# Patient Record
Sex: FEMALE | Race: WHITE | NOT HISPANIC OR LATINO | Employment: UNEMPLOYED | URBAN - METROPOLITAN AREA
[De-identification: names, ages, dates, MRNs, and addresses within clinical notes are randomized per-mention and may not be internally consistent; named-entity substitution may affect disease eponyms.]

---
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
## 2023-01-10 ENCOUNTER — HOSPITAL ENCOUNTER (OUTPATIENT)
Dept: RADIOLOGY | Facility: HOSPITAL | Age: 18
Discharge: HOME | End: 2023-01-10
Payer: COMMERCIAL

## 2023-01-10 ENCOUNTER — OFFICE VISIT (OUTPATIENT)
Dept: UROLOGY | Facility: CLINIC | Age: 18
End: 2023-01-10
Payer: COMMERCIAL

## 2023-01-10 VITALS — HEIGHT: 65 IN | WEIGHT: 116.19 LBS | BODY MASS INDEX: 19.36 KG/M2

## 2023-01-10 DIAGNOSIS — N20.0 KIDNEY STONE: Primary | ICD-10-CM

## 2023-01-10 DIAGNOSIS — R31.0 GROSS HEMATURIA: ICD-10-CM

## 2023-01-10 DIAGNOSIS — Z87.442 HISTORY OF KIDNEY STONES: ICD-10-CM

## 2023-01-10 DIAGNOSIS — N22 CALCULUS OF URINARY TRACT IN DISEASES CLASSIFIED ELSEWHERE: ICD-10-CM

## 2023-01-10 DIAGNOSIS — N20.1 URETERAL STONE: Primary | ICD-10-CM

## 2023-01-10 PROCEDURE — 1159F MED LIST DOCD IN RCRD: CPT | Mod: CPTII,S$GLB,,

## 2023-01-10 PROCEDURE — 87086 URINE CULTURE/COLONY COUNT: CPT

## 2023-01-10 PROCEDURE — 1160F RVW MEDS BY RX/DR IN RCRD: CPT | Mod: CPTII,S$GLB,,

## 2023-01-10 PROCEDURE — 1160F PR REVIEW ALL MEDS BY PRESCRIBER/CLIN PHARMACIST DOCUMENTED: ICD-10-PCS | Mod: CPTII,S$GLB,,

## 2023-01-10 PROCEDURE — 99214 OFFICE O/P EST MOD 30 MIN: CPT | Mod: S$GLB,,,

## 2023-01-10 PROCEDURE — 99999 PR PBB SHADOW E&M-EST. PATIENT-LVL II: CPT | Mod: PBBFAC,,,

## 2023-01-10 PROCEDURE — 3008F PR BODY MASS INDEX (BMI) DOCUMENTED: ICD-10-PCS | Mod: CPTII,S$GLB,,

## 2023-01-10 PROCEDURE — 74176 CT RENAL STONE STUDY ABD PELVIS WO: ICD-10-PCS | Mod: 26,,, | Performed by: RADIOLOGY

## 2023-01-10 PROCEDURE — 3008F BODY MASS INDEX DOCD: CPT | Mod: CPTII,S$GLB,,

## 2023-01-10 PROCEDURE — 1159F PR MEDICATION LIST DOCUMENTED IN MEDICAL RECORD: ICD-10-PCS | Mod: CPTII,S$GLB,,

## 2023-01-10 PROCEDURE — 74176 CT ABD & PELVIS W/O CONTRAST: CPT | Mod: 26,,, | Performed by: RADIOLOGY

## 2023-01-10 PROCEDURE — 99999 PR PBB SHADOW E&M-EST. PATIENT-LVL II: ICD-10-PCS | Mod: PBBFAC,,,

## 2023-01-10 PROCEDURE — 74176 CT ABD & PELVIS W/O CONTRAST: CPT | Mod: TC,PO

## 2023-01-10 PROCEDURE — 99214 PR OFFICE/OUTPT VISIT, EST, LEVL IV, 30-39 MIN: ICD-10-PCS | Mod: S$GLB,,,

## 2023-01-10 RX ORDER — TAMSULOSIN HYDROCHLORIDE 0.4 MG/1
0.4 CAPSULE ORAL DAILY
Qty: 10 CAPSULE | Refills: 1 | Status: SHIPPED | OUTPATIENT
Start: 2023-01-10 | End: 2023-09-20

## 2023-01-10 NOTE — PROGRESS NOTES
[facility] Note  Staff: Matilde Guevara FNP-C    PCP: MD Natividad    Chief Complaint: Gross Hematuria    Subjective:        HPI: Nadya Morris is a 17 y.o. female NEW PATIENT presents today for evaluation of gross hematuria.  She states she 1st noticed blood in her urine yesterday.  She states a few weeks ago she was experiencing urinary urgency, frequency, and lower abdominal pain.  She states she was treated for a UTI.  She does have a history of kidney stones.  Her most recent imaging is from 05/04/2022 which showed a 3 mm proximal left ureteral stone with mild left hydroureteronephrosis.  There were also punctate nonobstructing bilateral mid pole stones.  She states she is unsure if she passed this stone.  She denies dysuria, urgency, frequency, fever, flank pain, abdominal pain, back pain, nausea, vomiting, incontinence, and difficulty urinating.  She is currently not taking any bladder medications.  We discussed ED precautions.  She does have a family history of kidney stones.    Questions asked pt during office visit today:  Urgency:No, incontinence with urgency? No;   DysuriaNo  Gross HematuriaYes  History of UTI: No    History of Kidney Stones?:  Yes    Constipation issues?:  No    REVIEW OF SYSTEMS:  Review of Systems   Constitutional: Negative.  Negative for chills and fever.   HENT: Negative.     Eyes: Negative.    Respiratory: Negative.     Cardiovascular: Negative.    Gastrointestinal: Negative.  Negative for abdominal pain, nausea and vomiting.   Genitourinary:  Positive for hematuria. Negative for dysuria, flank pain, frequency and urgency.   Musculoskeletal: Negative.  Negative for back pain.   Skin: Negative.    Neurological: Negative.    Endo/Heme/Allergies: Negative.    Psychiatric/Behavioral: Negative.       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    Medications: reviewed     Objective:   There were no vitals filed for this visit.    Physical Exam  Constitutional:       Appearance: Normal appearance.   HENT:      Head: Normocephalic.      Mouth/Throat:      Mouth: Mucous membranes are moist.   Eyes:      Conjunctiva/sclera: Conjunctivae normal.   Pulmonary:      Effort: Pulmonary effort is normal.   Abdominal:      General: There is no distension.      Palpations: Abdomen is soft.      Tenderness: There is no abdominal tenderness. There is no right CVA tenderness or left CVA tenderness.   Musculoskeletal:         General: Normal range of motion.      Cervical back: Normal range of motion.   Skin:     General: Skin is warm.   Neurological:      General: No focal deficit present.      Mental Status: She is alert and oriented to person, place, and time.   Psychiatric:         Mood and Affect: Mood normal.         Behavior: Behavior normal.       LABS REVIEW:  UA today:  Could not be read on machine    Assessment:       1. Kidney stone    2. Gross hematuria    3. Calculus of urinary tract in diseases classified elsewhere    4. History of kidney stones          Plan:      CT RSS ordered stat  Recommendations:   Increase hydration 2 liters fluid per day.      Strain Urine     Take pain medications as needed     Call office for severe pain or fever >101    CT RSS shows Kidneys/ Ureters: 5 x 3 mm calculus at the left ureteropelvic junction.  Mild left hydronephrosis.No right renal or right ureteral calculi.   Bladder: No wall thickening.  2 mm calculus along the anterior bladder wall, unchanged.  No dependently layering calculi.    Tamsulosin 0.4mg prescribed to pt and pt given 7 day trial of passage.     F/u As Needed    [facility]:  Active    ROSAS Garcia

## 2023-01-12 LAB — BACTERIA UR CULT: NORMAL

## 2023-01-18 ENCOUNTER — PATIENT MESSAGE (OUTPATIENT)
Dept: UROLOGY | Facility: CLINIC | Age: 18
End: 2023-01-18
Payer: COMMERCIAL

## 2023-01-18 ENCOUNTER — NURSE TRIAGE (OUTPATIENT)
Dept: ADMINISTRATIVE | Facility: CLINIC | Age: 18
End: 2023-01-18
Payer: COMMERCIAL

## 2023-01-18 NOTE — TELEPHONE ENCOUNTER
Patient states she was seen and treated for kidney stones. Patient reports she was given a medication, Floxmax, to take and reports dizzy, nauseated, HA and foggy the past couple of days. Patient wondering if the medication should make her feel that way.Patient reports blurry vision comes and goes but YAP is constant. Patient reports mild weakness and moderate dizziness. Patient also reports she was told to take for 7 days but the RX said for 10 so she is unsure how long to take. Advised patient of dispo to see HCP within 24 hours. Verbalized understanding. Advised to call back if symptoms become worse or with further questions.      Reason for Disposition   [1] MODERATE dizziness (e.g., interferes with normal activities) AND [2] has NOT been evaluated by physician for this  (Exception: dizziness caused by heat exposure, sudden standing, or poor fluid intake)    Additional Information   Negative: SEVERE difficulty breathing (e.g., struggling for each breath, speaks in single words)   Negative: [1] Difficulty breathing or swallowing AND [2] started suddenly after medicine, an allergic food or bee sting   Negative: Shock suspected (e.g., cold/pale/clammy skin, too weak to stand, low BP, rapid pulse)   Negative: Difficult to awaken or acting confused (e.g., disoriented, slurred speech)   Negative: [1] Weakness (i.e., paralysis, loss of muscle strength) of the face, arm or leg on one side of the body AND [2] sudden onset AND [3] present now   Negative: [1] Numbness (i.e., loss of sensation) of the face, arm or leg on one side of the body AND [2] sudden onset AND [3] present now   Negative: [1] Loss of speech or garbled speech AND [2] sudden onset AND [3] present now   Negative: Overdose (accidental or intentional) of medications   Negative: [1] Fainted > 15 minutes ago AND [2] still feels too weak or dizzy to stand   Negative: Heart beating < 50 beats per minute OR > 140 beats per minute   Negative: Sounds like a  "life-threatening emergency to the triager   Negative: Difficulty breathing   Negative: SEVERE dizziness (e.g., unable to stand, requires support to walk, feels like passing out now)   Negative: Extra heart beats OR irregular heart beating (i.e., "palpitations")   Negative: [1] Drinking very little AND [2] dehydration suspected (e.g., no urine > 12 hours, very dry mouth, very lightheaded)   Negative: [1] Weakness (i.e., paralysis, loss of muscle strength) of the face, arm / hand, or leg / foot on one side of the body AND [2] sudden onset AND [3] brief (now gone)   Negative: [1] Numbness (i.e., loss of sensation) of the face, arm / hand, or leg / foot on one side of the body AND [2] sudden onset AND [3] brief (now gone)   Negative: [1] Loss of speech or garbled speech AND [2] sudden onset AND [3] brief (now gone)   Negative: Loss of vision or double vision (Exception: similar to previous migraines)   Negative: Patient sounds very sick or weak to the triager   Negative: [1] Dizziness caused by heat exposure, sudden standing, or poor fluid intake AND [2] no improvement after 2 hours of rest and fluids   Negative: [1] Fever > 103 F (39.4 C) AND [2] not able to get the fever down using Fever Care Advice   Negative: [1] Fever > 101 F (38.3 C) AND [2] age > 60 years   Negative: [1] Fever > 100.0 F (37.8 C) AND [2] bedridden (e.g., nursing home patient, CVA, chronic illness, recovering from surgery)   Negative: [1] Fever > 100.0 F (37.8 C) AND [2] diabetes mellitus or weak immune system (e.g., HIV positive, cancer chemo, splenectomy, organ transplant, chronic steroids)    Protocols used: Dizziness - [id number]-HC    "

## 2023-01-19 ENCOUNTER — PATIENT MESSAGE (OUTPATIENT)
Dept: UROLOGY | Facility: CLINIC | Age: 18
End: 2023-01-19
Payer: COMMERCIAL

## 2023-01-19 DIAGNOSIS — N20.1 URETERAL STONE: Primary | ICD-10-CM

## 2023-01-20 ENCOUNTER — HOSPITAL ENCOUNTER (OUTPATIENT)
Dept: RADIOLOGY | Facility: HOSPITAL | Age: 18
Discharge: HOME | End: 2023-01-20
Payer: COMMERCIAL

## 2023-01-20 ENCOUNTER — TELEPHONE (OUTPATIENT)
Dept: UROLOGY | Facility: CLINIC | Age: 18
End: 2023-01-20
Payer: COMMERCIAL

## 2023-01-20 DIAGNOSIS — N20.1 URETERAL STONE: ICD-10-CM

## 2023-01-20 PROCEDURE — 74018 XR ABDOMEN AP 1 VIEW: ICD-10-PCS | Mod: 26,,, | Performed by: STUDENT IN AN ORGANIZED HEALTH CARE EDUCATION/TRAINING PROGRAM

## 2023-01-20 PROCEDURE — 74018 RADEX ABDOMEN 1 VIEW: CPT | Mod: 26,,, | Performed by: STUDENT IN AN ORGANIZED HEALTH CARE EDUCATION/TRAINING PROGRAM

## 2023-01-20 PROCEDURE — 74018 RADEX ABDOMEN 1 VIEW: CPT | Mod: TC

## 2023-01-20 NOTE — TELEPHONE ENCOUNTER
----- Message from Keren Strong sent at 1/20/2023  4:47 PM CST -----  Contact: Nadya  .Type:  Test Results    Who Called: Nadya  Name of Test (Lab/Mammo/Etc): Lab  Date of Test: 01/20/2023  Ordering Provider: MISHA BARORS   Where the test was performed: [facility]  Would the patient rather a call back or a response via [facility]? Call back  Best Call Back Number: [phone]  Additional Information:  na        Thanks  GINGER

## 2023-01-23 ENCOUNTER — PATIENT MESSAGE (OUTPATIENT)
Dept: UROLOGY | Facility: CLINIC | Age: 18
End: 2023-01-23
Payer: COMMERCIAL

## 2023-09-20 ENCOUNTER — OFFICE VISIT (OUTPATIENT)
Dept: INTERNAL MEDICINE | Facility: CLINIC | Age: 18
End: 2023-09-20
Payer: COMMERCIAL

## 2023-09-20 VITALS
TEMPERATURE: 99 F | DIASTOLIC BLOOD PRESSURE: 78 MMHG | WEIGHT: 112.19 LBS | RESPIRATION RATE: 18 BRPM | BODY MASS INDEX: 18.96 KG/M2 | HEART RATE: 78 BPM | OXYGEN SATURATION: 100 % | SYSTOLIC BLOOD PRESSURE: 108 MMHG

## 2023-09-20 DIAGNOSIS — J02.0 STREP THROAT: ICD-10-CM

## 2023-09-20 DIAGNOSIS — J02.9 SORE THROAT: Primary | ICD-10-CM

## 2023-09-20 LAB
CTP QC/QA: YES
MOLECULAR STREP A: POSITIVE

## 2023-09-20 PROCEDURE — 87651 POCT STREP A MOLECULAR: ICD-10-PCS | Mod: QW,S$GLB,, | Performed by: FAMILY MEDICINE

## 2023-09-20 PROCEDURE — 3078F DIAST BP <80 MM HG: CPT | Mod: CPTII,S$GLB,, | Performed by: FAMILY MEDICINE

## 2023-09-20 PROCEDURE — 99203 OFFICE O/P NEW LOW 30 MIN: CPT | Mod: S$GLB,,, | Performed by: FAMILY MEDICINE

## 2023-09-20 PROCEDURE — 1159F MED LIST DOCD IN RCRD: CPT | Mod: CPTII,S$GLB,, | Performed by: FAMILY MEDICINE

## 2023-09-20 PROCEDURE — 99999 PR PBB SHADOW E&M-EST. PATIENT-LVL III: CPT | Mod: PBBFAC,,, | Performed by: FAMILY MEDICINE

## 2023-09-20 PROCEDURE — 3074F SYST BP LT 130 MM HG: CPT | Mod: CPTII,S$GLB,, | Performed by: FAMILY MEDICINE

## 2023-09-20 PROCEDURE — 1159F PR MEDICATION LIST DOCUMENTED IN MEDICAL RECORD: ICD-10-PCS | Mod: CPTII,S$GLB,, | Performed by: FAMILY MEDICINE

## 2023-09-20 PROCEDURE — 3078F PR MOST RECENT DIASTOLIC BLOOD PRESSURE < 80 MM HG: ICD-10-PCS | Mod: CPTII,S$GLB,, | Performed by: FAMILY MEDICINE

## 2023-09-20 PROCEDURE — 87651 STREP A DNA AMP PROBE: CPT | Mod: QW,S$GLB,, | Performed by: FAMILY MEDICINE

## 2023-09-20 PROCEDURE — 99999 PR PBB SHADOW E&M-EST. PATIENT-LVL III: ICD-10-PCS | Mod: PBBFAC,,, | Performed by: FAMILY MEDICINE

## 2023-09-20 PROCEDURE — 3074F PR MOST RECENT SYSTOLIC BLOOD PRESSURE < 130 MM HG: ICD-10-PCS | Mod: CPTII,S$GLB,, | Performed by: FAMILY MEDICINE

## 2023-09-20 PROCEDURE — 3008F BODY MASS INDEX DOCD: CPT | Mod: CPTII,S$GLB,, | Performed by: FAMILY MEDICINE

## 2023-09-20 PROCEDURE — 99203 PR OFFICE/OUTPT VISIT, NEW, LEVL III, 30-44 MIN: ICD-10-PCS | Mod: S$GLB,,, | Performed by: FAMILY MEDICINE

## 2023-09-20 PROCEDURE — 3008F PR BODY MASS INDEX (BMI) DOCUMENTED: ICD-10-PCS | Mod: CPTII,S$GLB,, | Performed by: FAMILY MEDICINE

## 2023-09-20 RX ORDER — AMOXICILLIN 875 MG/1
875 TABLET, FILM COATED ORAL EVERY 12 HOURS
Qty: 14 TABLET | Refills: 0 | Status: SHIPPED | OUTPATIENT
Start: 2023-09-20 | End: 2023-09-27

## 2023-09-20 NOTE — PATIENT INSTRUCTIONS
Take antibiotic as prescribed  Tylenol as needed for fever  Rest and Increase Fluid Intake  Start taking probiotic like culturelle or align to help limit GI upset from antibiotics  Should improve in 48-72 hours  RTC if any worsening despite above medicines or as needed

## 2023-09-20 NOTE — PROGRESS NOTES
Subjective:      Patient ID: Nadya Morris is a 18 y.o. female.    Chief Complaint: Sore Throat    Sore throat worsening for the past three days.   No fever.   Does sometimes have a rattling cough.   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)     Strep sore throat      Past Surgical History:   Procedure Laterality Date    TONSILLECTOMY       Review of patient's allergies indicates:  No Known Allergies  Social History     Socioeconomic History    Marital status: Single   Tobacco Use    Smoking status: Never    Smokeless tobacco: Never   Substance and Sexual Activity    Alcohol use: Never    Drug use: Never    Sexual activity: Never   Social History Narrative    11 th grade    Excellent grades    [employer] country     Family History   Problem Relation Age of Onset    No Known Problems Mother     No Known Problems Father        Review of Systems   Constitutional:  Negative for fever.   HENT:  Positive for sore throat. Negative for ear pain.    Respiratory:  Negative for cough and shortness of breath.    Cardiovascular:  Negative for chest pain.   Gastrointestinal:  Negative for diarrhea, nausea and vomiting.   Psychiatric/Behavioral:  Negative for dysphoric mood and sleep disturbance. The patient is not nervous/anxious.         Objective:   /78   Pulse 78   Temp 98.8 °F (37.1 °C)   Resp 18   Wt 50.9 kg (112 lb 3.4 oz)   LMP 08/20/2023 (Exact Date)   SpO2 100%   BMI 18.96 kg/m²     Physical Exam  Vitals and  nursing note reviewed.   Constitutional:       Appearance: Normal appearance.   HENT:      Head: Normocephalic.      Nose: No congestion.      Mouth/Throat:      Mouth: Mucous membranes are moist.      Pharynx: Posterior oropharyngeal erythema present.   Eyes:      Conjunctiva/sclera: Conjunctivae normal.   Cardiovascular:      Rate and Rhythm: Normal rate and regular rhythm.   Pulmonary:      Effort: Pulmonary effort is normal.      Breath sounds: Normal breath sounds.   Skin:     General: Skin is warm and dry.      Findings: No rash.   Neurological:      Mental Status: She is alert and oriented to person, place, and time. Mental status is at baseline.   Psychiatric:         Mood and Affect: Mood normal.         Behavior: Behavior normal.         Thought Content: Thought content normal.       Assessment:     1. Sore throat    2. Strep throat      Plan:       1. Sore throat  -     POCT Strep A, Molecular    2. Strep throat  -     amoxicillin (AMOXIL) 875 MG tablet; Take 1 tablet (875 mg total) by mouth every 12 (twelve) hours. for 7 days  Dispense: 14 tablet; Refill: 0       Recent Results (from the past 24 hour(s))   POCT Strep A, Molecular    Collection Time: 09/20/23  4:11 PM   Result Value Ref Range    Molecular Strep A, POC Positive (A) Negative     Acceptable Yes         Medication List with Changes/Refills   New Medications    AMOXICILLIN (AMOXIL) 875 MG TABLET    Take 1 tablet (875 mg total) by mouth every 12 (twelve) hours. for 7 days   Discontinued Medications    TAMSULOSIN (FLOMAX) 0.4 MG CAP    Take 1 capsule (0.4 mg total) by mouth once daily. Take at bedtime for 10 days                PATIENT INSTRUCTIONS:   Patient Instructions   Take antibiotic as prescribed  Tylenol as needed for fever  Rest and Increase Fluid Intake  Start taking probiotic like culturelle or align to help limit GI upset from antibiotics  Should improve in 48-72 hours  RTC if any worsening despite above medicines or  as needed       Follow up if symptoms worsen or fail to improve.

## 2024-08-19 ENCOUNTER — TELEPHONE (OUTPATIENT)
Dept: FAMILY MEDICINE | Facility: CLINIC | Age: 19
End: 2024-08-19
Payer: COMMERCIAL

## 2024-08-19 NOTE — TELEPHONE ENCOUNTER
----- Message from Sol Mir sent at 8/19/2024  2:28 PM CDT -----  Contact: Patient  Type:  Appointment Request        Name of Caller:Patient     When is the first available appointment?NA    Symptoms:Est Care/ Annual     Best Call Back Number:[phone]     Additional Information: Please call to advise

## 2024-09-19 ENCOUNTER — OFFICE VISIT (OUTPATIENT)
Dept: OTOLARYNGOLOGY | Facility: CLINIC | Age: 19
End: 2024-09-19
Payer: COMMERCIAL

## 2024-09-19 VITALS — WEIGHT: 112.19 LBS | HEIGHT: 65 IN | BODY MASS INDEX: 18.69 KG/M2

## 2024-09-19 DIAGNOSIS — H69.93 ETD (EUSTACHIAN TUBE DYSFUNCTION), BILATERAL: Primary | ICD-10-CM

## 2024-09-19 PROCEDURE — 99213 OFFICE O/P EST LOW 20 MIN: CPT | Mod: S$GLB,,, | Performed by: STUDENT IN AN ORGANIZED HEALTH CARE EDUCATION/TRAINING PROGRAM

## 2024-09-19 PROCEDURE — 99999 PR PBB SHADOW E&M-EST. PATIENT-LVL II: CPT | Mod: PBBFAC,,, | Performed by: STUDENT IN AN ORGANIZED HEALTH CARE EDUCATION/TRAINING PROGRAM

## 2024-09-19 PROCEDURE — 1159F MED LIST DOCD IN RCRD: CPT | Mod: CPTII,S$GLB,, | Performed by: STUDENT IN AN ORGANIZED HEALTH CARE EDUCATION/TRAINING PROGRAM

## 2024-09-19 PROCEDURE — 3008F BODY MASS INDEX DOCD: CPT | Mod: CPTII,S$GLB,, | Performed by: STUDENT IN AN ORGANIZED HEALTH CARE EDUCATION/TRAINING PROGRAM

## 2024-09-19 NOTE — PROGRESS NOTES
Chief complaint:   Chief Complaint   Patient presents with    Ear Problem     Pt has fullness in both ears x 2 days           Referring Provider:  No referring provider defined for this encounter.    History of Present Illness:     Ms. Morris is a 19 y.o. female presenting for evaluation of bilateral ear fullness . Onset of this chief complaint was about a few days ago.  Additional symptoms that also have been associated are muffled hearing, roaring tinnitus. The patient denies drainage, vertigo.  No recent illness. Treatment has included oral antihistamine without relief.     The patient endorses significant eustachian tube risk factors: e popping or crackling sensation    The patient denies significant hearing loss risk factors, ototoxic medication exposure, chronic vestibular suppressant use, head/ facial/ nelson trauma, and otologic surgery.        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          Past Surgical History:  Past Surgical History:   Procedure Laterality Date    TONSILLECTOMY     .         Medications: Medication list was reviewed. She  currently has no medications in their medication list.         Allergies: Review of patient's allergies indicates:  No Known Allergies         Family history: family history includes No Known Problems in her father and mother.         Social History          Alcohol use:  reports no history of alcohol use.            Tobacco:  reports that she has never smoked. She has never used smokeless tobacco.         Please see the patient's intake form for full details of past medical history, past surgical history, family history, social history and review of systems. ?This information was reviewed by me and noted.      Physical Examination     General: Well developed, well nourished, well hydrated. Verbal with a strong voice and not dysphonic.      Head/Face: Normocephalic, atraumatic. No scars or lesions. Facial musculature equal.     Eyes: No scleral icterus or conjunctival hemorrhage. EOMI. PERRLA.     Ears:     Right ear: No gross deformity. EAC is clear of debris and erythema. The TM is intact with a pneumatized middle ear. No signs of retraction, fluid or infection.      Left ear: No gross deformity. EAC is clear of debris and erythema. The TM is intact with a pneumatized middle ear. No signs of retraction, fluid or infection.     Hearing: grossly intact    Nose: No gross deformity or lesions. No purulent discharge. No significant NSD.      Mouth/Oropharynx: Lips without any lesions. No mucosal lesions within the oropharynx. No tonsillar exudate or lesions. Pharyngeal walls symmetrical. Uvula midline. Tongue midline without lesions.     Neck: Trachea midline. No masses. No thyromegaly or nodules palpated.     Lymphatic: No lymphadenopathy in the neck.     Extremities: No cyanosis. Warm and well-perfused.     Skin: No scars or lesions on face or neck.      Neurologic: Moving all extremities without gross abnormality.CN II-XII grossly intact. House-Brackmann 1/6. No signs of nystagmus.      Psych: Alert and oriented to person, place, and time with an appropriate mood and affect.     Data review:    Review of records:      I reviewed records from the referring provider's office visits.  These describe the history, workup, and/or treatment of this problem thus far.         Assessment/Plan:      1. ETD (Eustachian tube dysfunction), bilateral         Daily flonase  Afrin x 3-5d  Steroid taper - risks discussed; elected to hold off, will call back if not improved in 1-2 weeks and will plan for steroid taper      Félix Wood MD  [facility]   [facility] - [address]  [place] [id number]  P: [phone]  F: [phone]

## 2025-06-19 ENCOUNTER — TELEPHONE (OUTPATIENT)
Dept: FAMILY MEDICINE | Facility: CLINIC | Age: 20
End: 2025-06-19
Payer: COMMERCIAL

## 2025-06-19 NOTE — TELEPHONE ENCOUNTER
Copied from CRM #[id number]. Topic: Appointments - Appointment Scheduling  >> Jun 19, 2025  9:46 AM Sadie wrote:  Type:  Same Day Appointment Request    Caller is requesting a same day appointment.  Caller declined first available appointment listed below.      Name of Caller:  pt  When is the first available appointment?  6/23  Symptoms:  Symptoms: Headache, Fever, Nausea But No Vomiting, Sore Throat, Cough  Outcome: Talk to a nurse or provider within 15 minutes.  Reason: Caller denied all higher acuity questions    The caller rejected this outcome.  Best Call Back Number:  [phone]  Additional Information:   Pt has been feeling ill for 2 days. Father sees Franko Jacobs. Dad talked to Dr Raya and she told him that she would except her as a NP

## 2025-06-20 ENCOUNTER — OFFICE VISIT (OUTPATIENT)
Dept: FAMILY MEDICINE | Facility: CLINIC | Age: 20
End: 2025-06-20
Payer: COMMERCIAL

## 2025-06-20 VITALS
BODY MASS INDEX: 20.51 KG/M2 | OXYGEN SATURATION: 98 % | TEMPERATURE: 99 F | SYSTOLIC BLOOD PRESSURE: 122 MMHG | HEART RATE: 101 BPM | WEIGHT: 120.13 LBS | HEIGHT: 64 IN | DIASTOLIC BLOOD PRESSURE: 80 MMHG

## 2025-06-20 DIAGNOSIS — B09 VIRAL EXANTHEM: ICD-10-CM

## 2025-06-20 DIAGNOSIS — J10.1 INFLUENZA A: Primary | ICD-10-CM

## 2025-06-20 DIAGNOSIS — J02.9 PHARYNGITIS, UNSPECIFIED ETIOLOGY: ICD-10-CM

## 2025-06-20 LAB
B-HCG UR QL: NEGATIVE
BILIRUB SERPL-MCNC: NEGATIVE MG/DL
BLOOD URINE, POC: ABNORMAL
CLARITY, POC UA: CLEAR
COLOR, POC UA: ABNORMAL
CTP QC/QA: YES
GLUCOSE UR QL STRIP: NEGATIVE
KETONES UR QL STRIP: 40
LEUKOCYTE ESTERASE URINE, POC: NEGATIVE
MOLECULAR STREP A: NEGATIVE
NITRITE, POC UA: NEGATIVE
PH, POC UA: 6
POC MOLECULAR INFLUENZA A AGN: POSITIVE
POC MOLECULAR INFLUENZA B AGN: NEGATIVE
PROTEIN, POC: 30
SPECIFIC GRAVITY, POC UA: 1.02
UROBILINOGEN, POC UA: 0.2

## 2025-06-20 PROCEDURE — 81025 URINE PREGNANCY TEST: CPT | Mod: S$GLB,,, | Performed by: FAMILY MEDICINE

## 2025-06-20 PROCEDURE — 87651 STREP A DNA AMP PROBE: CPT | Mod: QW,S$GLB,, | Performed by: FAMILY MEDICINE

## 2025-06-20 PROCEDURE — 87502 INFLUENZA DNA AMP PROBE: CPT | Mod: QW,S$GLB,, | Performed by: FAMILY MEDICINE

## 2025-06-20 PROCEDURE — 1159F MED LIST DOCD IN RCRD: CPT | Mod: CPTII,S$GLB,, | Performed by: FAMILY MEDICINE

## 2025-06-20 PROCEDURE — 3074F SYST BP LT 130 MM HG: CPT | Mod: CPTII,S$GLB,, | Performed by: FAMILY MEDICINE

## 2025-06-20 PROCEDURE — 99214 OFFICE O/P EST MOD 30 MIN: CPT | Mod: S$GLB,,, | Performed by: FAMILY MEDICINE

## 2025-06-20 PROCEDURE — 3079F DIAST BP 80-89 MM HG: CPT | Mod: CPTII,S$GLB,, | Performed by: FAMILY MEDICINE

## 2025-06-20 PROCEDURE — 99999 PR PBB SHADOW E&M-EST. PATIENT-LVL III: CPT | Mod: PBBFAC,,, | Performed by: FAMILY MEDICINE

## 2025-06-20 PROCEDURE — 1160F RVW MEDS BY RX/DR IN RCRD: CPT | Mod: CPTII,S$GLB,, | Performed by: FAMILY MEDICINE

## 2025-06-20 PROCEDURE — 81002 URINALYSIS NONAUTO W/O SCOPE: CPT | Mod: S$GLB,,, | Performed by: FAMILY MEDICINE

## 2025-06-20 PROCEDURE — 3008F BODY MASS INDEX DOCD: CPT | Mod: CPTII,S$GLB,, | Performed by: FAMILY MEDICINE

## 2025-06-20 RX ORDER — OSELTAMIVIR PHOSPHATE 75 MG/1
75 CAPSULE ORAL 2 TIMES DAILY
Qty: 10 CAPSULE | Refills: 0 | Status: SHIPPED | OUTPATIENT
Start: 2025-06-20 | End: 2025-06-25

## 2025-06-20 RX ORDER — FLUTICASONE PROPIONATE 50 MCG
2 SPRAY, SUSPENSION (ML) NASAL DAILY
Qty: 16 G | Refills: 3 | Status: SHIPPED | OUTPATIENT
Start: 2025-06-20

## 2025-06-20 NOTE — PATIENT INSTRUCTIONS
Mucinex (chest congestion)  Mucinex-DM (chest congestion and cough suppression)    Nasal saline rinse or spray (2x/day) and follow with flonase

## 2025-06-20 NOTE — PROGRESS NOTES
Chief Complaint:  Chief Complaint   Patient presents with    Cough    Fever    Nasal Congestion    Nausea    Fatigue    Diarrhea    Sore Throat    Headache      Top symptoms started 4 days ago and the rash started yesterday     Low-back Pain    Leg Pain    Rash        HPI:  Nadya is a 20 y.o. year old     History of Present Illness    CHIEF COMPLAINT:  Nadya presents today with fever and respiratory symptoms for the past four days.    FLU-LIKE SYMPTOMS:  She reports fever with highest temperature of 102.4°F two days ago, currently lower than normal. She endorses chills and body aches. She has significant muscle tightness and soreness in bilateral calf muscles and thighs, which has worsened over the past two days causing impaired mobility and difficulty squatting. She denies any precipitating events or recent strenuous physical activity.    RESPIRATORY:  She reports persistent congestion with nasal drip that worsens when lying down. She has a productive cough throughout the day with mucus that is difficult to expectorate. She experiences sore throat and heavy mouth breathing due to nasal congestion. She occasionally experiences a stinging sensation in the chest with forceful coughing. She denies chest tightness or difficulty breathing.    HEADACHES:  She reports ongoing headaches in the front temples and eyeballs with fluctuating intensity. She experienced one episode of lightheadedness and specifically notes pressure pain in the right eye during a recent headache episode.    RASH:  She developed a new rash yesterday morning, initially on hands and subsequently spreading to elbows and knees, described as tiny red dots. She denies any associated itching.    GI:  She reports ongoing diarrhea in the context of pre-existing bowel irregularities.    PAST MEDICAL HISTORY:  History of PANDAS during childhood.    SURGICAL HISTORY:  Past tonsillectomy.    SOCIAL HISTORY:  She vapes nicotine daily and drinks alcohol  "occasionally.    ALLERGIES:  No known allergies.          Review of Systems   Constitutional:  Positive for chills and fever.   HENT:  Positive for congestion (some), postnasal drip and sore throat. Negative for ear pain and sinus pressure (yesterday had focal pressure/pain over the R eye).    Respiratory:  Positive for cough (all day). Negative for chest tightness and shortness of breath.    Cardiovascular:  Negative for chest pain.   Gastrointestinal:  Positive for diarrhea and nausea. Negative for abdominal pain and blood in stool.   Genitourinary:  Negative for dysuria, frequency, hematuria and menstrual problem.   Musculoskeletal:  Positive for myalgias.   Skin:  Positive for rash (erythematous rash to flexeural areas - fingers, elbows, knees).   Neurological:  Positive for light-headedness (1 episode previously) and headaches.   Psychiatric/Behavioral:  Negative for sleep disturbance.          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   Constitutional:       General: She is not in acute distress.     Appearance: Normal appearance. She is well-developed. She is ill-appearing. She is not toxic-appearing.   HENT:      Head: Normocephalic and atraumatic.      Right Ear: Tympanic membrane normal.      Left Ear: Tympanic membrane normal.   Eyes:      General: No scleral " icterus.        Right eye: No discharge.         Left eye: No discharge.      Conjunctiva/sclera: Conjunctivae normal.   Cardiovascular:      Rate and Rhythm: Normal rate and regular rhythm.   Pulmonary:      Effort: Pulmonary effort is normal. No respiratory distress.      Breath sounds: Normal breath sounds.   Abdominal:      Palpations: Abdomen is soft.      Tenderness: There is no guarding.   Musculoskeletal:         General: No deformity or signs of injury.      Cervical back: Neck supple.   Lymphadenopathy:      Cervical: No cervical adenopathy.   Skin:     General: Skin is warm and dry.      Findings: Rash (patchy erythematous rash to caitlin hands/fingers) present.   Neurological:      General: No focal deficit present.      Mental Status: She is alert and oriented to person, place, and time.   Psychiatric:         Mood and Affect: Mood normal.         Behavior: Behavior normal.             Assessment & Plan:  Influenza A  Comments:  reviewed symptom monitoring, self-mgmt with otcs  discussed optional use of tamiflu    Pharyngitis, unspecified etiology  -     POCT Influenza A/B Molecular  -     POCT Strep A, Molecular  -     POCT Urine Pregnancy  -     POCT Infectious Mononucleosis Antibody  -     POCT URINE DIPSTICK WITHOUT MICROSCOPE    Viral exanthem  Comments:  reviewed cause, monitoring    Other orders  -     fluticasone propionate (FLONASE) 50 mcg/actuation nasal spray; 2 sprays (100 mcg total) by Each Nostril route once daily.  Dispense: 16 g; Refill: 3  -     oseltamivir (TAMIFLU) 75 MG capsule; Take 1 capsule (75 mg total) by mouth 2 (two) times daily. for 5 days  Dispense: 10 capsule; Refill: 0         Assessment & Plan    ORDERS:   Ordered flu test, strep test, mono test, and urinalysis to evaluate for common infections including flu, strep, mononucleosis, and UTI given non-specific symptoms.    IMPRESSION:  Considered viral etiology given symptom presentation and negative COVID test.  Suspect viral  exanthem based on rash presentation.    PLAN SUMMARY:   Ordered flu test, strep test, mono test, and urinalysis   Started OTC Flonase nasal spray after saline rinse/spray for sinus congestion   Recommend alternating Tylenol and ibuprofen for symptom relief   Started OTC Mucinex or Mucinex DM for chest congestion and cough   Push fluids with electrolytes (e.g., Propel, Gatorade)   Maintain bland diet while stomach is unsettled   Use nasal saline rinse or spray twice daily   Contact office if symptoms worsen or new concerns arise    PATIENT EDUCATION:   Explained eustachian tube anatomy and connection to sinus congestion/ear pressure.   Discussed typical viral illness course, with days 2-3 often being worst before improvement.   Educated on importance of hydration to thin mucus secretions during illness.    ACTION ITEMS/LIFESTYLE:   Nadya to push fluids, preferably with electrolytes (e.g. Propel, Gatorade).   Recommend maintaining a bland diet while stomach is unsettled.   Nadya to use nasal saline rinse or spray twice daily.    MEDICATIONS:   Started OTC Mucinex or Mucinex DM for chest congestion and cough.   Started OTC Flonase (or generic equivalent) nasal spray following saline rinse/spray for sinus congestion.   Recommend alternating Tylenol and ibuprofen every few hours for symptom relief.    FOLLOW UP:   Contact the office if symptoms worsen or new concerns arise.         This note was generated with the assistance of ambient listening technology. Verbal consent was obtained by the patient and accompanying visitor(s) for the recording of patient appointment to facilitate this note. I attest to having reviewed and edited the generated note for accuracy, though some syntax or spelling errors may persist. Please contact the author of this note for any clarification.